# Patient Record
Sex: FEMALE
[De-identification: names, ages, dates, MRNs, and addresses within clinical notes are randomized per-mention and may not be internally consistent; named-entity substitution may affect disease eponyms.]

---

## 2020-12-08 ENCOUNTER — RESULT REVIEW (OUTPATIENT)
Age: 25
End: 2020-12-08

## 2024-03-15 ENCOUNTER — APPOINTMENT (RX ONLY)
Dept: URBAN - METROPOLITAN AREA CLINIC 15 | Facility: CLINIC | Age: 29
Setting detail: DERMATOLOGY
End: 2024-03-15

## 2024-03-15 VITALS — WEIGHT: 135 LBS | HEIGHT: 65 IN

## 2024-03-15 DIAGNOSIS — L81.1 CHLOASMA: ICD-10-CM

## 2024-03-15 DIAGNOSIS — L57.8 OTHER SKIN CHANGES DUE TO CHRONIC EXPOSURE TO NONIONIZING RADIATION: ICD-10-CM

## 2024-03-15 PROCEDURE — ? COUNSELING

## 2024-03-15 PROCEDURE — 99203 OFFICE O/P NEW LOW 30 MIN: CPT

## 2024-03-15 PROCEDURE — ? SUNSCREEN RECOMMENDATIONS

## 2024-03-15 PROCEDURE — ? PRESCRIPTION

## 2024-03-15 PROCEDURE — ? PRESCRIPTION MEDICATION MANAGEMENT

## 2024-03-15 RX ORDER — PHARMACY COMPOUNDING ACCESSORY
EACH MISCELLANEOUS QHS
Qty: 1 | Refills: 1 | COMMUNITY
Start: 2024-03-15

## 2024-03-15 RX ADMIN — Medication: at 00:00

## 2024-03-15 ASSESSMENT — LOCATION ZONE DERM: LOCATION ZONE: FACE

## 2024-03-15 ASSESSMENT — LOCATION DETAILED DESCRIPTION DERM
LOCATION DETAILED: LEFT INFERIOR CENTRAL MALAR CHEEK
LOCATION DETAILED: RIGHT INFERIOR CENTRAL MALAR CHEEK

## 2024-03-15 ASSESSMENT — LOCATION SIMPLE DESCRIPTION DERM
LOCATION SIMPLE: LEFT CHEEK
LOCATION SIMPLE: RIGHT CHEEK

## 2024-03-15 NOTE — PROCEDURE: PRESCRIPTION MEDICATION MANAGEMENT
Detail Level: Zone
Initiate Treatment: .\\n\\n- Hydroquinone 8% - apply a thin layer to entire face at night for 3 months\\n- May use hydrocortisone 1% (OTC) if experiencing irritation\\n- mineral sunscreen everyday - Alastin Hydratint or EltaMD\\n\\n.
Render In Strict Bullet Format?: No